# Patient Record
Sex: FEMALE | Race: WHITE | URBAN - NONMETROPOLITAN AREA
[De-identification: names, ages, dates, MRNs, and addresses within clinical notes are randomized per-mention and may not be internally consistent; named-entity substitution may affect disease eponyms.]

---

## 2019-01-30 ENCOUNTER — NEW PATIENT (OUTPATIENT)
Dept: URBAN - NONMETROPOLITAN AREA CLINIC 13 | Facility: CLINIC | Age: 59
End: 2019-01-30

## 2019-01-30 DIAGNOSIS — H25.13 AGE-RELATED NUCLEAR CATARACT, BILATERAL: ICD-10-CM

## 2019-01-30 DIAGNOSIS — H52.223 REGULAR ASTIGMATISM, BILATERAL: Primary | ICD-10-CM

## 2019-01-30 PROCEDURE — 92002 INTRM OPH EXAM NEW PATIENT: CPT | Performed by: OPTOMETRIST

## 2019-01-30 PROCEDURE — 92015 DETERMINE REFRACTIVE STATE: CPT | Performed by: OPTOMETRIST

## 2019-01-30 ASSESSMENT — VISUAL ACUITY
OS: 20/20
OD: 20/20

## 2019-01-30 ASSESSMENT — INTRAOCULAR PRESSURE
OS: 12
OD: 11

## 2019-05-22 ENCOUNTER — FOLLOW UP ESTABLISHED (OUTPATIENT)
Dept: URBAN - NONMETROPOLITAN AREA CLINIC 13 | Facility: CLINIC | Age: 59
End: 2019-05-22
Payer: COMMERCIAL

## 2019-05-22 ENCOUNTER — FOLLOW UP ESTABLISHED (OUTPATIENT)
Dept: URBAN - NONMETROPOLITAN AREA CLINIC 12 | Facility: CLINIC | Age: 59
End: 2019-05-22
Payer: COMMERCIAL

## 2019-05-22 DIAGNOSIS — H33.311 HORSESHOE TEAR OF RETINA WITHOUT DETACHMENT, RIGHT EYE: Primary | ICD-10-CM

## 2019-05-22 DIAGNOSIS — H35.61 RETINAL HEMORRHAGE, RIGHT EYE: Primary | ICD-10-CM

## 2019-05-22 PROCEDURE — 92014 COMPRE OPH EXAM EST PT 1/>: CPT | Performed by: OPTOMETRIST

## 2019-05-22 PROCEDURE — 92134 CPTRZ OPH DX IMG PST SGM RTA: CPT | Performed by: OPHTHALMOLOGY

## 2019-05-22 PROCEDURE — 67141 PROPH RTA DTCHMNT CRTX DTHRM: CPT | Performed by: OPHTHALMOLOGY

## 2019-05-22 PROCEDURE — 92004 COMPRE OPH EXAM NEW PT 1/>: CPT | Performed by: OPHTHALMOLOGY

## 2019-05-22 ASSESSMENT — INTRAOCULAR PRESSURE
OD: 17
OD: 13
OS: 14
OD: 17
OD: 13
OS: 19
OS: 19
OS: 14

## 2019-06-12 ENCOUNTER — FOLLOW UP ESTABLISHED (OUTPATIENT)
Dept: URBAN - NONMETROPOLITAN AREA CLINIC 13 | Facility: CLINIC | Age: 59
End: 2019-06-12
Payer: COMMERCIAL

## 2019-06-12 PROCEDURE — 92134 CPTRZ OPH DX IMG PST SGM RTA: CPT | Performed by: OPHTHALMOLOGY

## 2019-06-12 PROCEDURE — 99024 POSTOP FOLLOW-UP VISIT: CPT | Performed by: OPHTHALMOLOGY

## 2019-06-12 ASSESSMENT — INTRAOCULAR PRESSURE
OD: 14
OS: 14

## 2021-12-10 ENCOUNTER — OFFICE VISIT (OUTPATIENT)
Dept: URBAN - NONMETROPOLITAN AREA CLINIC 13 | Facility: CLINIC | Age: 61
End: 2021-12-10
Payer: COMMERCIAL

## 2021-12-10 DIAGNOSIS — H00.015 HORDEOLUM EXTERNUM LEFT LOWER EYELID: Primary | ICD-10-CM

## 2021-12-10 PROCEDURE — 99213 OFFICE O/P EST LOW 20 MIN: CPT | Performed by: OPTOMETRIST

## 2021-12-10 RX ORDER — NEOMYCIN SULFATE, POLYMYXIN B SULFATE AND DEXAMETHASONE 3.5; 10000; 1 MG/G; [USP'U]/G; MG/G
OINTMENT OPHTHALMIC
Qty: 3 | Refills: 0 | Status: ACTIVE
Start: 2021-12-10

## 2021-12-10 RX ORDER — OFLOXACIN 3 MG/ML
0.3 % SOLUTION/ DROPS OPHTHALMIC
Qty: 5 | Refills: 1 | Status: INACTIVE
Start: 2021-12-10 | End: 2021-12-11

## 2021-12-10 ASSESSMENT — INTRAOCULAR PRESSURE: OD: 11

## 2021-12-10 NOTE — IMPRESSION/PLAN
Impression: Hordeolum externum left lower eyelid: H00.015. Plan: monitor. pt to start ocuflox 4x daily into affected eye, pf tears daily, maxitrol cole qhs OS, pt ed on s/s of worsening issue or worsening infection need to rtc asap. monitor w/ 1 week w/ ant seg exam and IOP check. ? reason for recurrence of issue, may need further testing and good lid hygiene and possibly oral antibiotic in the future.